# Patient Record
Sex: MALE | Race: WHITE
[De-identification: names, ages, dates, MRNs, and addresses within clinical notes are randomized per-mention and may not be internally consistent; named-entity substitution may affect disease eponyms.]

---

## 2017-12-01 ENCOUNTER — HOSPITAL ENCOUNTER (EMERGENCY)
Dept: HOSPITAL 80 - FED | Age: 42
LOS: 1 days | Discharge: HOME | End: 2017-12-02
Payer: COMMERCIAL

## 2017-12-01 VITALS — TEMPERATURE: 98.1 F | RESPIRATION RATE: 16 BRPM

## 2017-12-01 DIAGNOSIS — R07.9: Primary | ICD-10-CM

## 2017-12-01 DIAGNOSIS — Z87.891: ICD-10-CM

## 2017-12-01 DIAGNOSIS — I10: ICD-10-CM

## 2017-12-01 LAB
% IMMATURE GRANULYOCYTES: 0.2 % (ref 0–1.1)
ABSOLUTE IMMATURE GRANULOCYTES: 0.02 10^3/UL (ref 0–0.1)
ABSOLUTE NRBC COUNT: 0 10^3/UL (ref 0–0.01)
ADD DIFF?: NO
ADD MORPH?: NO
ADD SCAN?: NO
ANION GAP SERPL CALC-SCNC: 12 MEQ/L (ref 8–16)
ATYPICAL LYMPHOCYTE FLAG: 0 (ref 0–99)
CALCIUM SERPL-MCNC: 9.7 MG/DL (ref 8.5–10.4)
CHLORIDE SERPL-SCNC: 101 MEQ/L (ref 97–110)
CO2 SERPL-SCNC: 24 MEQ/L (ref 22–31)
CREAT SERPL-MCNC: 1 MG/DL (ref 0.7–1.3)
ERYTHROCYTE [DISTWIDTH] IN BLOOD BY AUTOMATED COUNT: 11.6 % (ref 11.5–15.2)
FRAGMENT RBC FLAG: 0 (ref 0–99)
GFR SERPL CREATININE-BSD FRML MDRD: > 60 ML/MIN/{1.73_M2}
GLUCOSE SERPL-MCNC: 139 MG/DL (ref 70–100)
HCT VFR BLD CALC: 46.3 % (ref 40–51)
HGB BLD-MCNC: 16.7 G/DL (ref 13.7–17.5)
LEFT SHIFT FLG: 0 (ref 0–99)
LIPEMIA HEMOLYSIS FLAG: 90 (ref 0–99)
MCH RBC BLDCO QN: 30.3 PG (ref 27.9–34.1)
MCHC RBC AUTO-ENTMCNC: 36.1 G/DL (ref 32.4–36.7)
MCV RBC AUTO: 83.9 FL (ref 81.5–99.8)
NRBC-AUTO%: 0 % (ref 0–0.2)
PLATELET # BLD: 264 10^3/UL (ref 150–400)
PLATELET CLUMPS FLAG: 0 (ref 0–99)
PMV BLD AUTO: 9.1 FL (ref 8.7–11.7)
POTASSIUM SERPL-SCNC: 4.1 MEQ/L (ref 3.5–5.2)
RBC # BLD AUTO: 5.52 10^6/UL (ref 4.4–6.38)
SODIUM SERPL-SCNC: 137 MEQ/L (ref 134–144)
TROPONIN I SERPL-MCNC: < 0.012 NG/ML (ref 0–0.03)

## 2017-12-01 NOTE — CPEKG
Heart Rate: 74

RR Interval: 811

P-R Interval: 192

QRSD Interval: 82

QT Interval: 384

QTC Interval: 426

P Axis: 22

QRS Axis: 19

T Wave Axis: 40

EKG Severity - NORMAL ECG -

EKG Impression: SINUS RHYTHM

Electronically Signed By: Charles Kate 02-Dec-2017 07:19:49

## 2017-12-02 VITALS — DIASTOLIC BLOOD PRESSURE: 110 MMHG | SYSTOLIC BLOOD PRESSURE: 161 MMHG | HEART RATE: 80 BPM | OXYGEN SATURATION: 96 %

## 2017-12-02 NOTE — EDPHY
H & P


Stated Complaint: Chest tightness and dizziness started 1700


Time Seen by Provider: 12/01/17 23:00


HPI/ROS: 





Chief Complaint:  Chest tightness





HPI:  42-year-old male was cooking dinner at 5 o'clock tonight when he felt a 

fluttering and some disease in his chest.  He had short episodes lasting a 

seconds 4-4 times over the next hour and half.  Then at 8 o'clock she will be 

he had central tightness in his chest with some fluttering in his upper 

abdomen.  This lasted about 0.5 hr.  He had some mild discomfort left side of 

his neck and the back of his arms.  At worst is a 2/10.  He has some 

intermittent moments of shortness of breath but were not persistent.  Right now 

he is without complaint.  He had a cold last week but has been well for the 

last 5-6 days.  Does not have a history of similar episodes in the past.  Does 

not smoke.  Does not have a history of coronary artery disease.





ROS:  10 point Review of Systems is negative except as noted in the HPI.





PMH:  Anxiety





Social History:  Past smoker, quit 3-4 years ago, no alcohol,  no recreational 

drug use





Family History:  Both parents had breast cancer, no history of coronary artery 

disease stroke or diabetes





Physical Exam:


Gen: Awake, Alert, No Distress


HEENT:  


     Nose: no rhinorrhea


     Eyes: PERRLA, EOMI


     Mouth: Moist mucosa 


Neck: Supple, no JVD


Chest: nontender, lungs clear to auscultation


Heart: S1, S2 normal, no murmur


Abd: Soft, non-tender, no guarding


Back: no CVA tenderness, no midline tenderness 


Ext: no edema, non-tender


Skin: no rash


Neuro: CN II-XII intact, Sensation grossly intact, Strength 5/5 in bilateral 

upper and lower extremities








- Personal History


Current Tetanus Diphtheria and Acellular Pertussis (TDAP): Yes





- Medical/Surgical History


Hx Asthma: No


Hx Chronic Respiratory Disease: No


Hx Diabetes: No


Hx Cardiac Disease: No


Hx Renal Disease: No


Hx Cirrhosis: No


Hx Alcoholism: No


Hx HIV/AIDS: No


Hx Splenectomy or Spleen Trauma: No


Other PMH: HTN, anxiety, depression, pre-diabetic,





- Social History


Smoking Status: Former smoker


Constitutional: 


 Initial Vital Signs











Temperature (C)  36.7 C   12/01/17 22:54


 


Heart Rate  82   12/01/17 22:54


 


Respiratory Rate  16   12/01/17 22:54


 


Blood Pressure  182/109 H  12/01/17 22:54


 


O2 Sat (%)  97   12/01/17 22:54








 











O2 Delivery Mode               Room Air














Allergies/Adverse Reactions: 


 





No Known Allergies Allergy (Unverified 12/01/17 22:52)


 








Home Medications: 














 Medication  Instructions  Recorded


 


Aspirin 81mg (*)  12/01/17


 


Buspar (*)  12/01/17


 


Prilosec  12/01/17


 


Wellbutrin Xl  12/01/17


 


traZODone  12/01/17














Medical Decision Making





- Diagnostics


EKG Interpretation: 





ECG time 03/20/2003, sinus rhythm with a rate of 74, normal axis, normal 

intervals, no acute ST or T-wave changes.  Impression normal ECG.


ED Course/Re-evaluation: 





42-year-old male with no risk factors for coronary artery disease had chest 

tightness at 8 o'clock.  Initial ECG is negative.  Initial troponin is 

negative.  Will repeat a 6 hr troponin at 2 in the morning.  If negative 

patient to go home with outpatient follow-up.





- Data Points


Laboratory Results: 


 Laboratory Results





 12/01/17 22:54 





 12/01/17 22:54 





 











  12/01/17 12/01/17





  22:54 22:54


 


WBC    8.79 10^3/uL 10^3/uL





    (3.80-9.50) 


 


RBC    5.52 10^6/uL 10^6/uL





    (4.40-6.38) 


 


Hgb    16.7 g/dL g/dL





    (13.7-17.5) 


 


Hct    46.3 % %





    (40.0-51.0) 


 


MCV    83.9 fL fL





    (81.5-99.8) 


 


MCH    30.3 pg pg





    (27.9-34.1) 


 


MCHC    36.1 g/dL g/dL





    (32.4-36.7) 


 


RDW    11.6 % %





    (11.5-15.2) 


 


Plt Count    264 10^3/uL 10^3/uL





    (150-400) 


 


MPV    9.1 fL fL





    (8.7-11.7) 


 


Neut % (Auto)    62.8 % %





    (39.3-74.2) 


 


Lymph % (Auto)    25.9 % %





    (15.0-45.0) 


 


Mono % (Auto)    8.4 % %





    (4.5-13.0) 


 


Eos % (Auto)    2.2 % %





    (0.6-7.6) 


 


Baso % (Auto)    0.5 % %





    (0.3-1.7) 


 


Nucleat RBC Rel Count    0.0 % %





    (0.0-0.2) 


 


Absolute Neuts (auto)    5.52 10^3/uL 10^3/uL





    (1.70-6.50) 


 


Absolute Lymphs (auto)    2.28 10^3/uL 10^3/uL





    (1.00-3.00) 


 


Absolute Monos (auto)    0.74 10^3/uL 10^3/uL





    (0.30-0.80) 


 


Absolute Eos (auto)    0.19 10^3/uL 10^3/uL





    (0.03-0.40) 


 


Absolute Basos (auto)    0.04 10^3/uL 10^3/uL





    (0.02-0.10) 


 


Absolute Nucleated RBC    0.00 10^3/uL 10^3/uL





    (0-0.01) 


 


Immature Gran %    0.2 % %





    (0.0-1.1) 


 


Immature Gran #    0.02 10^3/uL 10^3/uL





    (0.00-0.10) 


 


Sodium  137 mEq/L mEq/L  





   (134-144)  


 


Potassium  4.1 mEq/L mEq/L  





   (3.5-5.2)  


 


Chloride  101 mEq/L mEq/L  





   ()  


 


Carbon Dioxide  24 mEq/l mEq/l  





   (22-31)  


 


Anion Gap  12 mEq/L mEq/L  





   (8-16)  


 


BUN  14 mg/dL mg/dL  





   (7-23)  


 


Creatinine  1.0 mg/dL mg/dL  





   (0.7-1.3)  


 


Estimated GFR  > 60   





   


 


Glucose  139 mg/dL H mg/dL  





   ()  


 


Calcium  9.7 mg/dL mg/dL  





   (8.5-10.4)  


 


Troponin I  < 0.012 ng/mL ng/mL  





   (0.000-0.034)  














Departure





- Departure


Disposition: Home, Routine, Self-Care


Clinical Impression: 


 Chest pain





Condition: Good


Instructions:  Chest Pain (ED)


Additional Instructions: 


Follow up with primary care physician in 2 days for further evaluation and to 

arrange for an outpatient stress test.


Return to the emergency department for return of chest pain, shortness of breath

, fevers, chills, cough, or any other concerns.


Referrals: 


Sonido Lopez MD [Primary Care Provider] - As per Instructions

## 2018-07-26 ENCOUNTER — HOSPITAL ENCOUNTER (OUTPATIENT)
Dept: HOSPITAL 80 - BMCIMAGING | Age: 43
End: 2018-07-26
Attending: FAMILY MEDICINE
Payer: COMMERCIAL

## 2018-07-26 DIAGNOSIS — Z13.828: Primary | ICD-10-CM
